# Patient Record
Sex: MALE | Race: WHITE | NOT HISPANIC OR LATINO | Employment: FULL TIME | URBAN - METROPOLITAN AREA
[De-identification: names, ages, dates, MRNs, and addresses within clinical notes are randomized per-mention and may not be internally consistent; named-entity substitution may affect disease eponyms.]

---

## 2020-08-11 PROBLEM — I10 ESSENTIAL HYPERTENSION: Status: ACTIVE | Noted: 2020-08-11

## 2020-08-11 PROBLEM — E86.0 DEHYDRATION: Status: ACTIVE | Noted: 2020-08-11

## 2020-08-11 PROBLEM — E87.20 LACTIC ACIDOSIS: Status: ACTIVE | Noted: 2020-08-11

## 2020-08-11 PROBLEM — R74.01 TRANSAMINITIS: Status: ACTIVE | Noted: 2020-08-11

## 2020-08-11 PROBLEM — R13.10 DYSPHAGIA: Status: ACTIVE | Noted: 2020-08-11

## 2020-08-11 PROBLEM — R79.89 PRERENAL AZOTEMIA: Status: ACTIVE | Noted: 2020-08-11

## 2020-08-12 PROBLEM — K29.00 ACUTE SUPERFICIAL GASTRITIS WITHOUT HEMORRHAGE: Status: ACTIVE | Noted: 2020-08-12

## 2020-08-12 PROBLEM — K29.80 DUODENITIS: Status: ACTIVE | Noted: 2020-08-12

## 2020-08-12 PROBLEM — K20.90 ESOPHAGITIS: Status: ACTIVE | Noted: 2020-08-12

## 2020-08-13 PROBLEM — R74.01 TRANSAMINITIS: Status: RESOLVED | Noted: 2020-08-11 | Resolved: 2020-08-13

## 2020-08-13 PROBLEM — I10 ESSENTIAL HYPERTENSION: Status: RESOLVED | Noted: 2020-08-11 | Resolved: 2020-08-13

## 2020-08-13 PROBLEM — E87.20 LACTIC ACIDOSIS: Status: RESOLVED | Noted: 2020-08-11 | Resolved: 2020-08-13

## 2020-08-13 PROBLEM — R79.89 PRERENAL AZOTEMIA: Status: RESOLVED | Noted: 2020-08-11 | Resolved: 2020-08-13

## 2020-08-13 PROBLEM — E86.0 DEHYDRATION: Status: RESOLVED | Noted: 2020-08-11 | Resolved: 2020-08-13

## 2020-08-13 PROBLEM — K29.00 ACUTE SUPERFICIAL GASTRITIS WITHOUT HEMORRHAGE: Status: RESOLVED | Noted: 2020-08-12 | Resolved: 2020-08-13

## 2020-08-13 PROBLEM — R13.10 DYSPHAGIA: Status: RESOLVED | Noted: 2020-08-11 | Resolved: 2020-08-13

## 2020-08-18 PROBLEM — K29.50 ANTRAL GASTRITIS: Status: ACTIVE | Noted: 2020-08-12

## 2020-08-18 PROBLEM — K20.80 ESOPHAGITIS, LOS ANGELES GRADE A: Status: ACTIVE | Noted: 2020-08-12

## 2020-08-28 PROBLEM — K22.4 ESOPHAGEAL SPASM: Status: ACTIVE | Noted: 2020-08-28

## 2020-08-28 PROBLEM — M25.519 SHOULDER PAIN: Status: ACTIVE | Noted: 2020-08-28

## 2020-08-28 PROBLEM — R82.90 URINE ABNORMALITY: Status: ACTIVE | Noted: 2020-08-28

## 2021-10-18 PROBLEM — F33.9 EPISODE OF RECURRENT MAJOR DEPRESSIVE DISORDER: Status: ACTIVE | Noted: 2021-10-18

## 2021-10-18 PROBLEM — G47.9 SLEEP DISORDER: Status: ACTIVE | Noted: 2021-10-18

## 2022-05-05 PROBLEM — R45.851 SUICIDAL IDEATION: Status: ACTIVE | Noted: 2022-05-05

## 2022-05-05 PROBLEM — R10.9 ABDOMINAL PAIN: Status: ACTIVE | Noted: 2022-05-05

## 2022-05-05 PROBLEM — R91.1 LUNG NODULE: Status: ACTIVE | Noted: 2022-05-05

## 2022-05-07 PROBLEM — F12.10 MARIJUANA ABUSE: Status: ACTIVE | Noted: 2022-05-07

## 2022-05-07 PROBLEM — F43.10 PTSD (POST-TRAUMATIC STRESS DISORDER): Status: ACTIVE | Noted: 2022-05-07

## 2022-05-07 PROBLEM — K58.0 IRRITABLE BOWEL SYNDROME WITH DIARRHEA: Status: ACTIVE | Noted: 2022-05-07

## 2022-05-07 PROBLEM — F41.9 ANXIETY: Status: ACTIVE | Noted: 2022-05-07

## 2022-05-07 PROBLEM — F33.2 SEVERE EPISODE OF RECURRENT MAJOR DEPRESSIVE DISORDER, WITHOUT PSYCHOTIC FEATURES: Status: ACTIVE | Noted: 2022-05-07

## 2022-12-22 PROBLEM — F33.3 MAJOR DEPRESSIVE DISORDER, RECURRENT, SEVERE WITH PSYCHOTIC FEATURES: Status: ACTIVE | Noted: 2022-12-22

## 2023-02-27 PROBLEM — R41.840 INATTENTION: Status: ACTIVE | Noted: 2023-02-27

## 2023-02-27 PROBLEM — G47.00 INSOMNIA: Status: ACTIVE | Noted: 2023-02-27

## 2023-03-10 ENCOUNTER — SOCIAL WORK (OUTPATIENT)
Dept: ADMINISTRATIVE | Facility: OTHER | Age: 43
End: 2023-03-10

## 2023-03-10 ENCOUNTER — PATIENT MESSAGE (OUTPATIENT)
Dept: ADMINISTRATIVE | Facility: OTHER | Age: 43
End: 2023-03-10

## 2023-03-10 NOTE — PROGRESS NOTES
Sw received a consult to assist with ADHD testing. Patient believes he may have ADHD. Sw called Patient ([phone]). A voice message was left requesting he call back.    Noelle Morales LCSW    [phone]

## 2023-03-10 NOTE — PROGRESS NOTES
Sw received a phone call back from Patient ([phone]). ADHD testing was discussed. Poncho explained there were no local in-network psychologists available to do the testing. Poncho offered him the option of completing [facility] testing through Dr. Rowe's office. It has a cash price of $125. Patient understands and is agreeable to this. Poncho faxed the referral to Dr. Rowe's office to review.    Noelle Morales LCSW    [phone]

## 2023-06-05 PROBLEM — K08.89 PAIN, DENTAL: Status: ACTIVE | Noted: 2023-06-05
